# Patient Record
Sex: FEMALE | Race: OTHER | NOT HISPANIC OR LATINO | Employment: FULL TIME | URBAN - METROPOLITAN AREA
[De-identification: names, ages, dates, MRNs, and addresses within clinical notes are randomized per-mention and may not be internally consistent; named-entity substitution may affect disease eponyms.]

---

## 2024-08-03 ENCOUNTER — HOSPITAL ENCOUNTER (OUTPATIENT)
Facility: HOSPITAL | Age: 39
Discharge: HOME | End: 2024-08-03
Attending: EMERGENCY MEDICINE
Payer: MEDICAID

## 2024-08-03 VITALS
WEIGHT: 111.3 LBS | TEMPERATURE: 98.7 F | HEART RATE: 110 BPM | HEIGHT: 66 IN | SYSTOLIC BLOOD PRESSURE: 140 MMHG | BODY MASS INDEX: 17.89 KG/M2 | RESPIRATION RATE: 18 BRPM | DIASTOLIC BLOOD PRESSURE: 89 MMHG | OXYGEN SATURATION: 100 %

## 2024-08-03 DIAGNOSIS — G44.209 TENSION HEADACHE: Primary | ICD-10-CM

## 2024-08-03 DIAGNOSIS — J34.89 SINUS DRAINAGE: ICD-10-CM

## 2024-08-03 LAB
FLUAV SUBTYP SPEC NAA+PROBE: NOT DETECTED
FLUBV RNA ISLT QL NAA+PROBE: NOT DETECTED
RSV RNA NPH QL NAA+NON-PROBE: NOT DETECTED
SARS-COV-2 RNA RESP QL NAA+PROBE: NOT DETECTED

## 2024-08-03 PROCEDURE — G0463 HOSPITAL OUTPT CLINIC VISIT: HCPCS | Performed by: EMERGENCY MEDICINE

## 2024-08-03 PROCEDURE — 25010000002 KETOROLAC TROMETHAMINE PER 15 MG: Performed by: EMERGENCY MEDICINE

## 2024-08-03 PROCEDURE — 87637 SARSCOV2&INF A&B&RSV AMP PRB: CPT | Performed by: EMERGENCY MEDICINE

## 2024-08-03 PROCEDURE — 99213 OFFICE O/P EST LOW 20 MIN: CPT | Performed by: EMERGENCY MEDICINE

## 2024-08-03 RX ORDER — KETOROLAC TROMETHAMINE 30 MG/ML
60 INJECTION, SOLUTION INTRAMUSCULAR; INTRAVENOUS ONCE
Status: COMPLETED | OUTPATIENT
Start: 2024-08-03 | End: 2024-08-03

## 2024-08-03 RX ORDER — AMOXICILLIN AND CLAVULANATE POTASSIUM 875; 125 MG/1; MG/1
1 TABLET, FILM COATED ORAL 2 TIMES DAILY
Qty: 14 TABLET | Refills: 0 | Status: SHIPPED | OUTPATIENT
Start: 2024-08-03 | End: 2024-08-10

## 2024-08-03 RX ORDER — NAPROXEN 500 MG/1
500 TABLET ORAL 2 TIMES DAILY PRN
Qty: 30 TABLET | Refills: 0 | Status: SHIPPED | OUTPATIENT
Start: 2024-08-03

## 2024-08-03 RX ADMIN — KETOROLAC TROMETHAMINE 60 MG: 30 INJECTION, SOLUTION INTRAMUSCULAR at 14:26

## 2024-08-03 NOTE — FSED PROVIDER NOTE
Subjective   History of Present Illness  This is a 39-year-old female presenting to the urgent care with chief complaint of headache.  Headache x 2 days.  Global.  Constant.  Slightly improved today.  Pressure.  5 out of 10.  Patient is reminiscent of her previous tension headaches.  Patient also had clear runny drainage from her right nostril.  Patient was concerned about the drainage.  No history of trauma.  No nausea, vomiting, photophobia, dizziness, weakness, fever.        Review of Systems   Constitutional: Negative.    HENT:  Positive for rhinorrhea.    Neurological:  Positive for headaches.   All other systems reviewed and are negative.      History reviewed. No pertinent past medical history.    Allergies   Allergen Reactions    Morphine Shortness Of Breath    Prochlorperazine Maleate Shortness Of Breath    Sulfa Antibiotics Anaphylaxis       Past Surgical History:   Procedure Laterality Date    APPENDECTOMY      CHOLECYSTECTOMY      DILATATION AND CURETTAGE      TONSILLECTOMY      TUBAL ABDOMINAL LIGATION         History reviewed. No pertinent family history.    Social History     Socioeconomic History    Marital status: Single   Tobacco Use    Smoking status: Every Day     Current packs/day: 0.50     Types: Cigarettes    Smokeless tobacco: Never   Vaping Use    Vaping status: Never Used   Substance and Sexual Activity    Alcohol use: Not Currently           Objective   Physical Exam  Vitals and nursing note reviewed.   Constitutional:       Appearance: Normal appearance.   HENT:      Head: Normocephalic and atraumatic.        Comments: Tenderness right maxillary sinus     Right Ear: Tympanic membrane normal.      Left Ear: Tympanic membrane and external ear normal.      Nose: No congestion or rhinorrhea.      Mouth/Throat:      Mouth: Mucous membranes are moist.      Pharynx: Oropharynx is clear.   Eyes:      Extraocular Movements: Extraocular movements intact.      Pupils: Pupils are equal, round, and  reactive to light.   Neck:      Vascular: No carotid bruit.   Cardiovascular:      Rate and Rhythm: Normal rate and regular rhythm.   Pulmonary:      Effort: Pulmonary effort is normal.      Breath sounds: Normal breath sounds.   Musculoskeletal:         General: Swelling and tenderness present.      Cervical back: Normal range of motion and neck supple. No rigidity or tenderness.   Lymphadenopathy:      Cervical: No cervical adenopathy.   Skin:     General: Skin is warm.   Neurological:      General: No focal deficit present.      Mental Status: She is alert and oriented to person, place, and time.   Psychiatric:         Mood and Affect: Mood normal.         Behavior: Behavior normal.         Procedures           ED Course      Upon reevaluation patient states that she has had prior sinus surgeries.  Crna surgeries were approximately 14 years ago.  She has not had any complications or known CFS communications during that time.  Patient was educated on signs of meningitis, sinus infections and what to do if she revealed or develop any of those signs.  Patient will be given some Augmentin for her right maxillary sinus and Naprosyn for her headache.                                     Medical Decision Making  Amount and/or Complexity of Data Reviewed  Labs: ordered.    Risk  Prescription drug management.        Final diagnoses:   Tension headache   Sinus drainage       ED Disposition  ED Disposition       ED Disposition   Discharge    Condition   Stable    Comment   --               Sourav Perkins MD  [address] [address]  [phone]    In 2 days  As needed         Medication List        New Prescriptions      amoxicillin-clavulanate 875-125 MG per tablet  Commonly known as: AUGMENTIN  Take 1 tablet by mouth 2 (Two) Times a Day for 7 days.     naproxen 500 MG tablet  Commonly known as: NAPROSYN  Take 1 tablet by mouth 2 (Two) Times a Day As Needed for Mild Pain.               Where to Get  Your Medications        These medications were sent to [id number] - [place], [place] - [address] - [phone]  - [phone] FX  [address] [place] [address]      Phone: [phone]   amoxicillin-clavulanate 875-125 MG per tablet  naproxen 500 MG tablet

## 2024-11-09 ENCOUNTER — APPOINTMENT (OUTPATIENT)
Dept: GENERAL RADIOLOGY | Facility: HOSPITAL | Age: 39
End: 2024-11-09
Payer: MEDICAID

## 2024-11-09 ENCOUNTER — HOSPITAL ENCOUNTER (OUTPATIENT)
Facility: HOSPITAL | Age: 39
Discharge: HOME | End: 2024-11-09
Attending: EMERGENCY MEDICINE | Admitting: EMERGENCY MEDICINE
Payer: MEDICAID

## 2024-11-09 VITALS
DIASTOLIC BLOOD PRESSURE: 83 MMHG | HEIGHT: 66 IN | OXYGEN SATURATION: 97 % | WEIGHT: 110 LBS | BODY MASS INDEX: 17.68 KG/M2 | RESPIRATION RATE: 18 BRPM | TEMPERATURE: 98 F | SYSTOLIC BLOOD PRESSURE: 137 MMHG | HEART RATE: 101 BPM

## 2024-11-09 DIAGNOSIS — M25.571 ACUTE RIGHT ANKLE PAIN: Primary | ICD-10-CM

## 2024-11-09 PROCEDURE — 73610 X-RAY EXAM OF ANKLE: CPT

## 2024-11-09 PROCEDURE — G0463 HOSPITAL OUTPT CLINIC VISIT: HCPCS

## 2024-11-09 PROCEDURE — 25010000002 KETOROLAC TROMETHAMINE PER 15 MG

## 2024-11-09 RX ORDER — KETOROLAC TROMETHAMINE 30 MG/ML
30 INJECTION, SOLUTION INTRAMUSCULAR; INTRAVENOUS ONCE
Status: COMPLETED | OUTPATIENT
Start: 2024-11-09 | End: 2024-11-09

## 2024-11-09 RX ADMIN — KETOROLAC TROMETHAMINE 30 MG: 30 INJECTION, SOLUTION INTRAMUSCULAR at 16:05

## 2024-11-09 NOTE — DISCHARGE INSTRUCTIONS
Recommend alternating between Tylenol and ibuprofen for right ankle pain    Recommend Ace wrap for compression while you are sleeping to reduce movements and allow healing    Doing alternating between ice therapy and heat therapy several times a day    Follow-up with your family doctor if pain persist as you may need a referral to physical therapy    Return to ER for worsening symptoms

## 2024-11-09 NOTE — FSED PROVIDER NOTE
Subjective   History of Present Illness  39-year-old female reports she has a chronic condition where her lower extremities will swell.  She reports that she is on her feet at her job quite a bit.  She reports that she had swelling earlier in the week to her feet but that the swelling is gone down.  She reports that yesterday she began to have pain to the outer aspect of her right ankle.  She denies any known injury or trauma.  She reports the pain is more acute with weightbearing.  She reports taking Aleve earlier today but has not had any other pain medication.  She denies past injury or trauma.        Review of Systems   All other systems reviewed and are negative.      No past medical history on file.    Allergies   Allergen Reactions    Morphine Shortness Of Breath    Prochlorperazine Maleate Shortness Of Breath    Sulfa Antibiotics Anaphylaxis       Past Surgical History:   Procedure Laterality Date    APPENDECTOMY      CHOLECYSTECTOMY      DILATATION AND CURETTAGE      TONSILLECTOMY      TUBAL ABDOMINAL LIGATION         No family history on file.    Social History     Socioeconomic History    Marital status: Single   Tobacco Use    Smoking status: Every Day     Current packs/day: 0.50     Types: Cigarettes    Smokeless tobacco: Never   Vaping Use    Vaping status: Never Used   Substance and Sexual Activity    Alcohol use: Not Currently           Objective   Physical Exam  Vitals and nursing note reviewed.   Constitutional:       General: She is not in acute distress.     Appearance: Normal appearance. She is not toxic-appearing.   HENT:      Head: Normocephalic and atraumatic.      Mouth/Throat:      Mouth: Mucous membranes are moist.      Pharynx: Oropharynx is clear.   Eyes:      Extraocular Movements: Extraocular movements intact.      Conjunctiva/sclera: Conjunctivae normal.      Pupils: Pupils are equal, round, and reactive to light.   Cardiovascular:      Rate and Rhythm: Normal rate.      Pulses:  Normal pulses.   Pulmonary:      Effort: Pulmonary effort is normal.      Breath sounds: Normal breath sounds.   Abdominal:      General: Abdomen is flat. Bowel sounds are normal.      Palpations: Abdomen is soft.   Musculoskeletal:         General: Tenderness (Patient has tenderness to the lateral aspect of the right hindfoot, no bruising or redness noted on exam) present. No swelling or deformity.      Cervical back: Normal range of motion.      Right lower leg: No edema.   Skin:     General: Skin is warm.      Capillary Refill: Capillary refill takes less than 2 seconds.   Neurological:      General: No focal deficit present.      Mental Status: She is alert and oriented to person, place, and time. Mental status is at baseline.         Procedures           ED Course  ED Course as of 11/09/24 1700   Sat Nov 09, 2024   1633 X-ray right ankle  Impression:  Negative      [WF]      ED Course User Index  [WF] Jose Hardy Jr., APRN                                           Medical Decision Making  I have ordered an injection of Toradol along with an x-ray of the right ankle.    X-ray of the right ankle is negative for acute findings and recommending Ace wrap rest icing and heat.  I have offered the patient a work note however she does not need this.    Patient is agreeable plan of care and discharge.    Problems Addressed:  Acute right ankle pain: complicated acute illness or injury    Amount and/or Complexity of Data Reviewed  Radiology: ordered.    Risk  Prescription drug management.        Final diagnoses:   Acute right ankle pain       ED Disposition  ED Disposition       ED Disposition   Discharge    Condition   Stable    Comment   --               PATIENT CONNECTION - [facility], [place] [address]  [phone]  Schedule an appointment as soon as possible for a visit   Or follow-up with your primary care provider         Medication List      No changes were made to your prescriptions during this visit.